# Patient Record
Sex: FEMALE
[De-identification: names, ages, dates, MRNs, and addresses within clinical notes are randomized per-mention and may not be internally consistent; named-entity substitution may affect disease eponyms.]

---

## 2022-04-28 PROBLEM — Z00.00 ENCOUNTER FOR PREVENTIVE HEALTH EXAMINATION: Status: ACTIVE | Noted: 2022-04-28

## 2022-04-28 PROBLEM — Z00.00 ENCOUNTER FOR PREVENTIVE HEALTH EXAMINATION: Noted: 2022-04-28

## 2022-05-26 ENCOUNTER — APPOINTMENT (OUTPATIENT)
Dept: ORTHOPEDIC SURGERY | Facility: CLINIC | Age: 67
End: 2022-05-26
Payer: MEDICARE

## 2022-05-26 DIAGNOSIS — M25.512 PAIN IN LEFT SHOULDER: ICD-10-CM

## 2022-05-26 DIAGNOSIS — Z78.9 OTHER SPECIFIED HEALTH STATUS: ICD-10-CM

## 2022-05-26 DIAGNOSIS — M75.02 ADHESIVE CAPSULITIS OF LEFT SHOULDER: ICD-10-CM

## 2022-05-26 PROCEDURE — 99213 OFFICE O/P EST LOW 20 MIN: CPT

## 2022-05-31 PROBLEM — M75.02 ADHESIVE CAPSULITIS OF LEFT SHOULDER: Status: ACTIVE | Noted: 2022-05-31

## 2022-05-31 PROBLEM — M25.512 LEFT SHOULDER PAIN, UNSPECIFIED CHRONICITY: Status: ACTIVE | Noted: 2022-05-31

## 2022-05-31 NOTE — HISTORY OF PRESENT ILLNESS
[de-identified] : The patient returns for her continued left shoulder pain. She reports that her pain has decreased to mild-moderate and intermittent with use. She does feel that her ROM has increased since her last visit with PT help. She has had decreased ROM and strength without trauma to the shoulder. She denies paresthesias or numbness. She also feels restricted in IR. The patient has lateral and anterior shoulder pain with ADLs and weightbearing activity. The patient has a history of hypothyroidism. She is actively taking levothyroxine.

## 2022-05-31 NOTE — PHYSICAL EXAM
[Normal Coordination] : normal coordination [Normal Sensation] : normal sensation [Normal Mood and Affect] : normal mood and affect [Orientated] : orientated [Able to Communicate] : able to communicate [Normal Skin] : normal skin [Well Developed] : well developed [Well Nourished] : well nourished [Peripheral vascular exam is grossly normal] : peripheral vascular exam is grossly normal [Left] : left shoulder [Supine] : supine [Standing] : standing [4 ___] : forward flexion 4[unfilled]/5 [4___] : external rotation 4[unfilled]/5 [5___] : internal rotation 5[unfilled]/5 [Right] : right shoulder [NL (0-180)] : full active abduction 0-180 degrees [NL (0-70)] : full internal rotation 0-70 degrees [NL (0-90)] : full external rotation 0-90 degrees [] : negative [place] [TWNoteComboBox7] : active forward flexion 140 degrees [TWNoteComboBox4] : passive forward flexion 110 degrees [de-identified] : active abduction 80 degrees [TWNoteComboBox9] : passive abduction 80 degrees [TWNoteComboBox6] : internal rotation 40 degrees [de-identified] : external rotation 55 degrees [de-identified] : external rotation at 90 degrees of abduction 75 degrees [TWNoteComboBox5] : internal rotation at 90 degrees of abduction 30 degrees

## 2022-05-31 NOTE — DISCUSSION/SUMMARY
[de-identified] : The patient has left shoulder adhesive capsulitis in the frozen stage.\par \par The patient is doing well and feels that PT has been beneficial. \par She tried prednisone at her previous visit with relief of her pain.\par The patient denies new trauma.\par She will transition to HEP. \par She will follow up as needed.

## 2022-07-14 ENCOUNTER — NON-APPOINTMENT (OUTPATIENT)
Age: 67
End: 2022-07-14

## 2022-11-19 ENCOUNTER — NON-APPOINTMENT (OUTPATIENT)
Age: 67
End: 2022-11-19

## 2023-05-16 ENCOUNTER — OFFICE (OUTPATIENT)
Dept: URBAN - METROPOLITAN AREA CLINIC 104 | Facility: CLINIC | Age: 68
Setting detail: OPHTHALMOLOGY
End: 2023-05-16
Payer: MEDICARE

## 2023-05-16 DIAGNOSIS — H25.13: ICD-10-CM

## 2023-05-16 DIAGNOSIS — H35.3123: ICD-10-CM

## 2023-05-16 DIAGNOSIS — H01.001: ICD-10-CM

## 2023-05-16 DIAGNOSIS — H01.005: ICD-10-CM

## 2023-05-16 DIAGNOSIS — H01.004: ICD-10-CM

## 2023-05-16 DIAGNOSIS — H01.002: ICD-10-CM

## 2023-05-16 PROCEDURE — 92134 CPTRZ OPH DX IMG PST SGM RTA: CPT | Performed by: OPTOMETRIST

## 2023-05-16 PROCEDURE — 92004 COMPRE OPH EXAM NEW PT 1/>: CPT | Performed by: OPTOMETRIST

## 2023-05-16 ASSESSMENT — REFRACTION_CURRENTRX
OD_OVR_VA: 20/
OS_CYLINDER: -2.25
OS_SPHERE: -7.00
OS_ADD: +2.25
OD_SPHERE: -3.75
OD_CYLINDER: -2.25
OD_ADD: +1.75
OS_OVR_VA: 20/
OS_AXIS: 169
OD_AXIS: 004

## 2023-05-16 ASSESSMENT — VISUAL ACUITY
OD_BCVA: 20/350
OS_BCVA: 20/40

## 2023-05-16 ASSESSMENT — REFRACTION_AUTOREFRACTION
OD_AXIS: 179
OD_CYLINDER: -1.25
OS_AXIS: 146
OD_SPHERE: -3.25
OS_SPHERE: -10.00
OS_CYLINDER: -2.25

## 2023-05-16 ASSESSMENT — REFRACTION_MANIFEST
OS_VA1: 20/NI
OS_CYLINDER: -2.25
OD_VA1: 20/25+2
OD_AXIS: 005
OS_AXIS: 146
OD_CYLINDER: -2.00
OS_SPHERE: -10.00
OD_SPHERE: -3.25

## 2023-05-16 ASSESSMENT — LID EXAM ASSESSMENTS
OS_BLEPHARITIS: LLL LUL 1+
OD_BLEPHARITIS: RLL RUL 1+

## 2023-05-16 ASSESSMENT — KERATOMETRY
OD_AXISANGLE_DEGREES: 083
OS_AXISANGLE_DEGREES: 066
OS_K1POWER_DIOPTERS: 41.21
OS_K2POWER_DIOPTERS: 42.67
OD_K1POWER_DIOPTERS: 40.91
OD_K2POWER_DIOPTERS: 42.35

## 2023-05-16 ASSESSMENT — TONOMETRY
OS_IOP_MMHG: 18
OD_IOP_MMHG: 18

## 2023-05-16 ASSESSMENT — SPHEQUIV_DERIVED
OS_SPHEQUIV: -11.125
OS_SPHEQUIV: -11.125
OD_SPHEQUIV: -3.875
OD_SPHEQUIV: -4.25

## 2023-05-16 ASSESSMENT — AXIALLENGTH_DERIVED
OD_AL: 26.19
OS_AL: 29.79
OD_AL: 26.01
OS_AL: 29.79

## 2023-05-16 ASSESSMENT — CONFRONTATIONAL VISUAL FIELD TEST (CVF)
OS_FINDINGS: FULL
OD_FINDINGS: FULL

## 2023-07-14 ENCOUNTER — OFFICE (OUTPATIENT)
Dept: URBAN - METROPOLITAN AREA CLINIC 104 | Facility: CLINIC | Age: 68
Setting detail: OPHTHALMOLOGY
End: 2023-07-14
Payer: MEDICARE

## 2023-07-14 DIAGNOSIS — H35.3124: ICD-10-CM

## 2023-07-14 PROCEDURE — 92012 INTRM OPH EXAM EST PATIENT: CPT | Performed by: SPECIALIST

## 2023-07-14 PROCEDURE — 92134 CPTRZ OPH DX IMG PST SGM RTA: CPT | Performed by: SPECIALIST

## 2023-07-14 ASSESSMENT — REFRACTION_AUTOREFRACTION
OD_SPHERE: -3.25
OD_CYLINDER: -1.25
OS_CYLINDER: -2.25
OD_AXIS: 179
OS_SPHERE: -10.00
OS_AXIS: 146

## 2023-07-14 ASSESSMENT — SPHEQUIV_DERIVED
OD_SPHEQUIV: -3.875
OS_SPHEQUIV: -11.125

## 2023-07-14 ASSESSMENT — CONFRONTATIONAL VISUAL FIELD TEST (CVF)
OS_FINDINGS: FULL
OD_FINDINGS: FULL

## 2023-07-14 ASSESSMENT — KERATOMETRY
OD_K1POWER_DIOPTERS: 40.91
OD_K2POWER_DIOPTERS: 42.35
OS_AXISANGLE_DEGREES: 066
OS_K1POWER_DIOPTERS: 41.21
OS_K2POWER_DIOPTERS: 42.67
OD_AXISANGLE_DEGREES: 083

## 2023-07-14 ASSESSMENT — AXIALLENGTH_DERIVED
OD_AL: 26.01
OS_AL: 29.79

## 2023-07-14 ASSESSMENT — LID EXAM ASSESSMENTS
OS_BLEPHARITIS: LLL LUL 1+
OD_BLEPHARITIS: RLL RUL 1+

## 2023-07-14 ASSESSMENT — VISUAL ACUITY
OD_BCVA: 20/400
OS_BCVA: 20/25-3

## 2023-08-05 ENCOUNTER — OFFICE (OUTPATIENT)
Dept: URBAN - METROPOLITAN AREA CLINIC 94 | Facility: CLINIC | Age: 68
Setting detail: OPHTHALMOLOGY
End: 2023-08-05
Payer: MEDICARE

## 2023-08-05 DIAGNOSIS — H35.3124: ICD-10-CM

## 2023-08-05 PROCEDURE — 92134 CPTRZ OPH DX IMG PST SGM RTA: CPT | Performed by: SPECIALIST

## 2023-08-05 PROCEDURE — 92014 COMPRE OPH EXAM EST PT 1/>: CPT | Performed by: SPECIALIST

## 2023-08-05 ASSESSMENT — KERATOMETRY
OD_K2POWER_DIOPTERS: 42.35
OD_K1POWER_DIOPTERS: 40.91
OS_K1POWER_DIOPTERS: 41.21
OS_AXISANGLE_DEGREES: 066
OS_K2POWER_DIOPTERS: 42.67
OD_AXISANGLE_DEGREES: 083

## 2023-08-05 ASSESSMENT — SPHEQUIV_DERIVED
OS_SPHEQUIV: -11.125
OD_SPHEQUIV: -3.875

## 2023-08-05 ASSESSMENT — REFRACTION_AUTOREFRACTION
OS_CYLINDER: -2.25
OS_AXIS: 146
OD_AXIS: 179
OS_SPHERE: -10.00
OD_SPHERE: -3.25
OD_CYLINDER: -1.25

## 2023-08-05 ASSESSMENT — TONOMETRY
OD_IOP_MMHG: 16
OS_IOP_MMHG: 16

## 2023-08-05 ASSESSMENT — CONFRONTATIONAL VISUAL FIELD TEST (CVF)
OD_FINDINGS: FULL
OS_FINDINGS: FULL

## 2023-08-05 ASSESSMENT — AXIALLENGTH_DERIVED
OS_AL: 29.79
OD_AL: 26.01

## 2023-08-05 ASSESSMENT — VISUAL ACUITY
OS_BCVA: 20/25-1
OD_BCVA: 20/400

## 2023-08-05 ASSESSMENT — LID EXAM ASSESSMENTS
OD_BLEPHARITIS: RLL RUL 1+
OS_BLEPHARITIS: LLL LUL 1+

## 2023-12-02 ENCOUNTER — OFFICE (OUTPATIENT)
Dept: URBAN - METROPOLITAN AREA CLINIC 94 | Facility: CLINIC | Age: 68
Setting detail: OPHTHALMOLOGY
End: 2023-12-02
Payer: MEDICARE

## 2023-12-02 DIAGNOSIS — H35.3124: ICD-10-CM

## 2023-12-02 PROCEDURE — 92012 INTRM OPH EXAM EST PATIENT: CPT | Performed by: SPECIALIST

## 2023-12-02 PROCEDURE — 92134 CPTRZ OPH DX IMG PST SGM RTA: CPT | Performed by: SPECIALIST

## 2023-12-02 ASSESSMENT — REFRACTION_AUTOREFRACTION
OD_SPHERE: -3.25
OD_AXIS: 179
OS_AXIS: 146
OS_CYLINDER: -2.25
OS_SPHERE: -10.00
OD_CYLINDER: -1.25

## 2023-12-02 ASSESSMENT — LID EXAM ASSESSMENTS
OS_BLEPHARITIS: LLL LUL 1+
OD_BLEPHARITIS: RLL RUL 1+

## 2023-12-02 ASSESSMENT — SPHEQUIV_DERIVED
OD_SPHEQUIV: -3.875
OS_SPHEQUIV: -11.125

## 2023-12-02 ASSESSMENT — CONFRONTATIONAL VISUAL FIELD TEST (CVF)
OS_FINDINGS: FULL
OD_FINDINGS: FULL

## 2024-07-20 ENCOUNTER — OFFICE (OUTPATIENT)
Dept: URBAN - METROPOLITAN AREA CLINIC 94 | Facility: CLINIC | Age: 69
Setting detail: OPHTHALMOLOGY
End: 2024-07-20
Payer: MEDICARE

## 2024-07-20 DIAGNOSIS — H31.002: ICD-10-CM

## 2024-07-20 DIAGNOSIS — H35.3124: ICD-10-CM

## 2024-07-20 PROCEDURE — 92014 COMPRE OPH EXAM EST PT 1/>: CPT | Performed by: SPECIALIST

## 2024-07-20 PROCEDURE — 92134 CPTRZ OPH DX IMG PST SGM RTA: CPT | Performed by: SPECIALIST

## 2024-07-20 ASSESSMENT — CONFRONTATIONAL VISUAL FIELD TEST (CVF)
OD_FINDINGS: FULL
OS_FINDINGS: FULL

## 2024-07-20 ASSESSMENT — LID EXAM ASSESSMENTS
OD_BLEPHARITIS: RLL RUL 1+
OS_BLEPHARITIS: LLL LUL 1+

## 2024-07-26 ENCOUNTER — OFFICE (OUTPATIENT)
Dept: URBAN - METROPOLITAN AREA CLINIC 104 | Facility: CLINIC | Age: 69
Setting detail: OPHTHALMOLOGY
End: 2024-07-26
Payer: MEDICARE

## 2024-07-26 DIAGNOSIS — H52.4: ICD-10-CM

## 2024-07-26 PROBLEM — H31.002 CHORIORETINAL SCARS; LEFT EYE: Status: ACTIVE | Noted: 2024-07-20

## 2024-07-26 PROCEDURE — 92015 DETERMINE REFRACTIVE STATE: CPT | Performed by: OPTOMETRIST

## 2024-07-26 ASSESSMENT — CONFRONTATIONAL VISUAL FIELD TEST (CVF)
OS_FINDINGS: FULL
OD_FINDINGS: FULL

## 2024-07-26 ASSESSMENT — LID EXAM ASSESSMENTS
OS_BLEPHARITIS: LLL LUL 1+
OD_BLEPHARITIS: RLL RUL 1+

## 2025-01-04 ENCOUNTER — OFFICE (OUTPATIENT)
Dept: URBAN - METROPOLITAN AREA CLINIC 94 | Facility: CLINIC | Age: 70
Setting detail: OPHTHALMOLOGY
End: 2025-01-04
Payer: MEDICARE

## 2025-01-04 DIAGNOSIS — H31.002: ICD-10-CM

## 2025-01-04 DIAGNOSIS — H35.3124: ICD-10-CM

## 2025-01-04 PROCEDURE — 92014 COMPRE OPH EXAM EST PT 1/>: CPT | Performed by: SPECIALIST

## 2025-01-04 PROCEDURE — 92134 CPTRZ OPH DX IMG PST SGM RTA: CPT | Performed by: SPECIALIST

## 2025-01-04 ASSESSMENT — REFRACTION_AUTOREFRACTION
OD_CYLINDER: -0.75
OS_AXIS: 149
OD_AXIS: 177
OS_SPHERE: -10.00
OS_CYLINDER: -2.00
OD_SPHERE: -2.75

## 2025-01-04 ASSESSMENT — KERATOMETRY
OS_K1POWER_DIOPTERS: 41.21
OS_AXISANGLE_DEGREES: 076
OD_K1POWER_DIOPTERS: 40.71
OD_K2POWER_DIOPTERS: 41.82
OS_K2POWER_DIOPTERS: 42.24
OD_AXISANGLE_DEGREES: 080

## 2025-01-04 ASSESSMENT — LID EXAM ASSESSMENTS
OD_BLEPHARITIS: RLL RUL 1+
OS_BLEPHARITIS: LLL LUL 1+

## 2025-01-04 ASSESSMENT — VISUAL ACUITY
OD_BCVA: CF 3FT
OS_BCVA: 20/20-1

## 2025-01-04 ASSESSMENT — CONFRONTATIONAL VISUAL FIELD TEST (CVF)
OS_FINDINGS: FULL
OD_FINDINGS: FULL

## 2025-01-04 ASSESSMENT — TONOMETRY
OD_IOP_MMHG: 17
OS_IOP_MMHG: 19

## 2025-07-07 ENCOUNTER — OFFICE (OUTPATIENT)
Dept: URBAN - METROPOLITAN AREA CLINIC 94 | Facility: CLINIC | Age: 70
Setting detail: OPHTHALMOLOGY
End: 2025-07-07
Payer: MEDICARE

## 2025-07-07 DIAGNOSIS — H35.3124: ICD-10-CM

## 2025-07-07 DIAGNOSIS — H31.002: ICD-10-CM

## 2025-07-07 PROCEDURE — 92250 FUNDUS PHOTOGRAPHY W/I&R: CPT | Performed by: SPECIALIST

## 2025-07-07 PROCEDURE — 92014 COMPRE OPH EXAM EST PT 1/>: CPT | Performed by: SPECIALIST

## 2025-07-07 ASSESSMENT — VISUAL ACUITY
OS_BCVA: 20/25
OD_BCVA: CF 3FT

## 2025-07-07 ASSESSMENT — KERATOMETRY
OS_K1POWER_DIOPTERS: 41.21
OD_K1POWER_DIOPTERS: 40.71
OS_K2POWER_DIOPTERS: 42.24
OD_K2POWER_DIOPTERS: 41.82
OD_AXISANGLE_DEGREES: 080
OS_AXISANGLE_DEGREES: 076

## 2025-07-07 ASSESSMENT — CONFRONTATIONAL VISUAL FIELD TEST (CVF)
OD_FINDINGS: FULL
OS_FINDINGS: FULL

## 2025-07-07 ASSESSMENT — REFRACTION_AUTOREFRACTION
OS_SPHERE: -10.00
OD_CYLINDER: -0.75
OS_CYLINDER: -2.00
OS_AXIS: 149
OD_AXIS: 177
OD_SPHERE: -2.75

## 2025-07-07 ASSESSMENT — LID EXAM ASSESSMENTS
OD_BLEPHARITIS: RLL RUL 1+
OS_BLEPHARITIS: LLL LUL 1+

## 2025-07-29 ENCOUNTER — OFFICE (OUTPATIENT)
Dept: URBAN - METROPOLITAN AREA CLINIC 104 | Facility: CLINIC | Age: 70
Setting detail: OPHTHALMOLOGY
End: 2025-07-29
Payer: MEDICARE

## 2025-07-29 DIAGNOSIS — H01.005: ICD-10-CM

## 2025-07-29 DIAGNOSIS — H31.002: ICD-10-CM

## 2025-07-29 DIAGNOSIS — H01.001: ICD-10-CM

## 2025-07-29 DIAGNOSIS — H01.004: ICD-10-CM

## 2025-07-29 DIAGNOSIS — H25.13: ICD-10-CM

## 2025-07-29 DIAGNOSIS — H01.002: ICD-10-CM

## 2025-07-29 DIAGNOSIS — H35.3124: ICD-10-CM

## 2025-07-29 PROCEDURE — 92250 FUNDUS PHOTOGRAPHY W/I&R: CPT | Performed by: OPTOMETRIST

## 2025-07-29 PROCEDURE — 92014 COMPRE OPH EXAM EST PT 1/>: CPT | Performed by: OPTOMETRIST

## 2025-07-29 ASSESSMENT — KERATOMETRY
OD_K2POWER_DIOPTERS: 42.94
OS_K2POWER_DIOPTERS: 42.88
OD_AXISANGLE_DEGREES: 080
OS_K1POWER_DIOPTERS: 41.21
OD_K1POWER_DIOPTERS: 40.76
OS_AXISANGLE_DEGREES: 065

## 2025-07-29 ASSESSMENT — VISUAL ACUITY
OD_BCVA: CF 3FT
OS_BCVA: 20/40

## 2025-07-29 ASSESSMENT — CONFRONTATIONAL VISUAL FIELD TEST (CVF)
OD_FINDINGS: FULL
OS_FINDINGS: FULL

## 2025-07-29 ASSESSMENT — REFRACTION_AUTOREFRACTION
OS_CYLINDER: -0.50
OD_SPHERE: -2.50
OD_CYLINDER: -1.50
OS_AXIS: 133
OD_AXIS: 173
OS_SPHERE: -9.50

## 2025-07-29 ASSESSMENT — LID EXAM ASSESSMENTS
OS_BLEPHARITIS: LLL LUL 1+
OD_BLEPHARITIS: RLL RUL 1+